# Patient Record
Sex: FEMALE | Race: WHITE | Employment: UNEMPLOYED | URBAN - METROPOLITAN AREA
[De-identification: names, ages, dates, MRNs, and addresses within clinical notes are randomized per-mention and may not be internally consistent; named-entity substitution may affect disease eponyms.]

---

## 2024-02-18 ENCOUNTER — APPOINTMENT (OUTPATIENT)
Dept: CT IMAGING | Age: 39
End: 2024-02-18

## 2024-02-18 ENCOUNTER — HOSPITAL ENCOUNTER (EMERGENCY)
Age: 39
Discharge: HOME | End: 2024-02-18

## 2024-02-18 VITALS
HEART RATE: 71 BPM | DIASTOLIC BLOOD PRESSURE: 80 MMHG | TEMPERATURE: 97.7 F | WEIGHT: 230 LBS | RESPIRATION RATE: 18 BRPM | SYSTOLIC BLOOD PRESSURE: 131 MMHG | OXYGEN SATURATION: 94 %

## 2024-02-18 DIAGNOSIS — M51.36 BULGING OF LUMBAR INTERVERTEBRAL DISC WITHOUT MYELOPATHY: Primary | ICD-10-CM

## 2024-02-18 DIAGNOSIS — M54.16 LUMBAR RADICULAR PAIN: ICD-10-CM

## 2024-02-18 LAB — HCG UR QL: NEGATIVE

## 2024-02-18 PROCEDURE — 6360000002 HC RX W HCPCS

## 2024-02-18 PROCEDURE — 96372 THER/PROPH/DIAG INJ SC/IM: CPT

## 2024-02-18 PROCEDURE — 99284 EMERGENCY DEPT VISIT MOD MDM: CPT

## 2024-02-18 PROCEDURE — 72131 CT LUMBAR SPINE W/O DYE: CPT

## 2024-02-18 PROCEDURE — 84703 CHORIONIC GONADOTROPIN ASSAY: CPT

## 2024-02-18 PROCEDURE — 6370000000 HC RX 637 (ALT 250 FOR IP)

## 2024-02-18 RX ORDER — KETOROLAC TROMETHAMINE 30 MG/ML
30 INJECTION, SOLUTION INTRAMUSCULAR; INTRAVENOUS ONCE
Status: COMPLETED | OUTPATIENT
Start: 2024-02-18 | End: 2024-02-18

## 2024-02-18 RX ORDER — LIDOCAINE 4 G/G
1 PATCH TOPICAL DAILY
Status: DISCONTINUED | OUTPATIENT
Start: 2024-02-18 | End: 2024-02-18 | Stop reason: HOSPADM

## 2024-02-18 RX ORDER — DEXAMETHASONE SODIUM PHOSPHATE 10 MG/ML
10 INJECTION INTRAMUSCULAR; INTRAVENOUS ONCE
Status: COMPLETED | OUTPATIENT
Start: 2024-02-18 | End: 2024-02-18

## 2024-02-18 RX ORDER — LIDOCAINE 50 MG/G
1 PATCH TOPICAL DAILY
Qty: 10 PATCH | Refills: 0 | Status: SHIPPED | OUTPATIENT
Start: 2024-02-18 | End: 2024-02-28

## 2024-02-18 RX ORDER — PREDNISONE 20 MG/1
TABLET ORAL
Qty: 18 TABLET | Refills: 0 | Status: SHIPPED | OUTPATIENT
Start: 2024-02-18 | End: 2024-02-28

## 2024-02-18 RX ORDER — HYDROCODONE BITARTRATE AND ACETAMINOPHEN 5; 325 MG/1; MG/1
1 TABLET ORAL EVERY 6 HOURS PRN
Qty: 12 TABLET | Refills: 0 | Status: SHIPPED | OUTPATIENT
Start: 2024-02-18 | End: 2024-02-21

## 2024-02-18 RX ADMIN — DEXAMETHASONE SODIUM PHOSPHATE 10 MG: 10 INJECTION INTRAMUSCULAR; INTRAVENOUS at 12:00

## 2024-02-18 RX ADMIN — KETOROLAC TROMETHAMINE 30 MG: 30 INJECTION, SOLUTION INTRAMUSCULAR; INTRAVENOUS at 12:00

## 2024-02-18 ASSESSMENT — PAIN DESCRIPTION - DESCRIPTORS: DESCRIPTORS: ACHING;SHOOTING;SORE;SPASM

## 2024-02-18 ASSESSMENT — LIFESTYLE VARIABLES
HOW MANY STANDARD DRINKS CONTAINING ALCOHOL DO YOU HAVE ON A TYPICAL DAY: PATIENT DOES NOT DRINK
HOW OFTEN DO YOU HAVE A DRINK CONTAINING ALCOHOL: NEVER

## 2024-02-18 ASSESSMENT — PAIN - FUNCTIONAL ASSESSMENT: PAIN_FUNCTIONAL_ASSESSMENT: 0-10

## 2024-02-18 ASSESSMENT — PAIN DESCRIPTION - ORIENTATION: ORIENTATION: LOWER

## 2024-02-18 ASSESSMENT — PAIN DESCRIPTION - LOCATION: LOCATION: BACK

## 2024-02-18 ASSESSMENT — PAIN DESCRIPTION - FREQUENCY: FREQUENCY: CONTINUOUS

## 2024-02-18 ASSESSMENT — PAIN SCALES - GENERAL: PAINLEVEL_OUTOF10: 10

## 2024-02-18 ASSESSMENT — PAIN DESCRIPTION - ONSET: ONSET: ON-GOING

## 2024-02-18 ASSESSMENT — PAIN DESCRIPTION - PAIN TYPE: TYPE: ACUTE PAIN

## 2024-02-18 NOTE — ED PROVIDER NOTES
Independent JUAN[facility]O Visit      [facility]  Department of Emergency Medicine   ED  Encounter Note  Admit Date/RoomTime: 2024 11:09 AM  ED Room:     NAME: Carly Brito  : 1985  MRN: [id number]     Chief Complaint:  Back Pain (Lower back pain, last night threw back back out, )    History of Present Illness   History provided by the patient    Carly Brito is a 39 y.o. old female with a prior history of recurrent intermittent self limited episodes of low back pain, who is breast-feeding presenting to the emergency department by private vehicle, for non-traumatic acute, aching, sharp, and stiffness bilateral, paraspinal lower lumbar spine pain with radiation, to the anterior right and left thighs, which started after rolling over in bed last night. Since onset the symptoms have been persistent and is moderate-to-severe.  She has associated signs & symptoms of tingling to bilateral thighs and tightness into her lower abdomen.  She has had an episode of similar pain in the past and states that she was given an injection of steroids which helped She denies any recent back injection, recent spinal surgery, recent spinal/chiropractic manipulation, history of IVDA, fever, bladder incontinence, bowel incontinence, bladder retention, bladder urgency, bowel urgency, saddle paresthesias , sacral numbness, burning, or numbness.   The pain is aggraveated by flexion, extension, twisting, sitting, standing, and lying down and relieved by nothing despite medication use and rest.  She took ibuprofen 400 mg around 1:30 AM without improvement. She has never been enrolled in a pain management program.      ROS   Pertinent positives and negatives are stated within HPI, all other systems reviewed and are negative.    Past Medical History:  has no past medical history on file.    Surgical History:  has no past surgical history on file.    Social History:  reports that she has never smoked. She has